# Patient Record
Sex: FEMALE | Race: BLACK OR AFRICAN AMERICAN | Employment: PART TIME | URBAN - METROPOLITAN AREA
[De-identification: names, ages, dates, MRNs, and addresses within clinical notes are randomized per-mention and may not be internally consistent; named-entity substitution may affect disease eponyms.]

---
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
## 2023-12-02 NOTE — PLAN OF CARE
Patient a/o x4, on room air. Temps lightly elevated 99.3 in PM. Continued on IVF, IV ceftriaxone, and nystatin. Potassium being replaced. Pt reports still having loose BMs, increased frequency of urine. No nausea/vomiting noted. Up to bathroom by self. Family visiting at bedside. Enteric precautions maintained, fall precautions maintained, call light in reach, bed locked in low position. Problem: Patient Centered Care  Goal: Patient preferences are identified and integrated in the patient's plan of care  Description: Interventions:  - What would you like us to know as we care for you?  I am a sophomore at [employer].  - Provide timely, complete, and accurate information to patient/family  - Incorporate patient and family knowledge, values, beliefs, and cultural backgrounds into the planning and delivery of care  - Encourage patient/family to participate in care and decision-making at the level they choose  - Honor patient and family perspectives and choices  Outcome: Progressing     Problem: Patient/Family Goals  Goal: Patient/Family Long Term Goal  Description: Patient's Long Term Goal: Feel better    Interventions:  - IV abx  - Replace electrolytes  - IVF  - UA  - GI stool panel  - Strict intake and ouput  - See additional Care Plan goals for specific interventions  Outcome: Progressing  Goal: Patient/Family Short Term Goal  Description: Patient's Short Term Goal: No pain    Interventions:   - Pain meds prn  - US kidney  - CT pelvis  - Monitor vitals  - Reposition  - See additional Care Plan goals for specific interventions  Outcome: Progressing     Problem: CARDIOVASCULAR - ADULT  Goal: Maintains optimal cardiac output and hemodynamic stability  Description: INTERVENTIONS:  - Monitor vital signs, rhythm, and trends  - Monitor for bleeding, hypotension and signs of decreased cardiac output  - Evaluate effectiveness of vasoactive medications to optimize hemodynamic stability  - Monitor arterial and/or venous puncture sites for bleeding and/or hematoma  - Assess quality of pulses, skin color and temperature  - Assess for signs of decreased coronary artery perfusion - ex.  Angina  - Evaluate fluid balance, assess for edema, trend weights  Outcome: Progressing  Goal: Absence of cardiac arrhythmias or at baseline  Description: INTERVENTIONS:  - Continuous cardiac monitoring, monitor vital signs, obtain 12 lead EKG if indicated  - Evaluate effectiveness of antiarrhythmic and heart rate control medications as ordered  - Initiate emergency measures for life threatening arrhythmias  - Monitor electrolytes and administer replacement therapy as ordered  Outcome: Progressing     Problem: PAIN - ADULT  Goal: Verbalizes/displays adequate comfort level or patient's stated pain goal  Description: INTERVENTIONS:  - Encourage pt to monitor pain and request assistance  - Assess pain using appropriate pain scale  - Administer analgesics based on type and severity of pain and evaluate response  - Implement non-pharmacological measures as appropriate and evaluate response  - Consider cultural and social influences on pain and pain management  - Manage/alleviate anxiety  - Utilize distraction and/or relaxation techniques  - Monitor for opioid side effects  - Notify MD/LIP if interventions unsuccessful or patient reports new pain  - Anticipate increased pain with activity and pre-medicate as appropriate  Outcome: Progressing     Problem: RISK FOR INFECTION - ADULT  Goal: Absence of fever/infection during anticipated neutropenic period  Description: INTERVENTIONS  - Monitor WBC  - Administer growth factors as ordered  - Implement neutropenic guidelines  Outcome: Progressing     Problem: GASTROINTESTINAL - ADULT  Goal: Minimal or absence of nausea and vomiting  Description: INTERVENTIONS:  - Maintain adequate hydration with IV or PO as ordered and tolerated  - Nasogastric tube to low intermittent suction as ordered  - Evaluate effectiveness of ordered antiemetic medications  - Provide nonpharmacologic comfort measures as appropriate  - Advance diet as tolerated, if ordered  - Obtain nutritional consult as needed  - Evaluate fluid balance  Outcome: Progressing  Goal: Maintains or returns to baseline bowel function  Description: INTERVENTIONS:  - Assess bowel function  - Maintain adequate hydration with IV or PO as ordered and tolerated  - Evaluate effectiveness of GI medications  - Encourage mobilization and activity  - Obtain nutritional consult as needed  - Establish a toileting routine/schedule  - Consider collaborating with pharmacy to review patient's medication profile  Outcome: Progressing declines

## (undated) NOTE — LETTER
11/29/2018              Amie Sanchez        [id number]         To Whom it may concern:     This is to certify that Jaki Diana had an appointment on 11/29/2018 at 12:05 PM with Gia Meyers MD.        Sincerely,    S

## (undated) NOTE — LETTER
VACCINE ADMINISTRATION RECORD  PARENT / GUARDIAN APPROVAL  Date: 2019  Vaccine administered to: Latisha Nation     : 3/11/2005    MRN: [id number]    A copy of the appropriate Centers for Disease Control and Prevention Vaccine Information statement ha

## (undated) NOTE — MR AVS SNAPSHOT
[address]               Thank you for choosing us for your health care visit with HENNA Trammell.   We are glad to serve you and happy to provide you with this summary of your visi virus. Your child may have coughing, phlegm, body aches, and a low fever. A common type of bronchitis is known as a chest cold (acute bronchitis). This often happens after a respiratory infection like a common cold.  Bronchitis can take weeks to go away, bu blood. UTIs are very common. Antibiotics usually help treat these infections. · Some ear infections. In some cases, your child’s healthcare provider may prescribe antibiotics for an ear infection.  Your child may need a test to show what’s causing the ear · Use a humidifier or cool mist vaporizer in your child’s room. · Have your child gargle with saltwater.   · Have your child drink warm liquids.   To ease ear pain:   · Hold a warm, moist washcloth on your child’s ear for 10 minutes at a time.      When to Commonly known as:  AUGMENTIN                Where to Get Your Medications      These medications were sent to [address] [place] [address] [address] , [phone], [phone]  [address] Rickey Settler To help children live healthy active lives, parents can:  o Be role models themselves by making healthy eating and daily physical activity the norm for their family.   o Create a home where healthy choices are available and encouraged  o Make it fun – find

## (undated) NOTE — LETTER
Name:  Elizabeth Young Year:  11th Grade Class: Student ID No.:   Address:  [address] [address] Phone:  [phone] (home)  :  10year old   Name Relationship [address] Work Phone Home Phone Mobile Phone   1.  JOSHI polymorphic ventricular tachycardia? 13. Does anyone in your family have a heart problem, pacemaker, or implanted defibrillator? 12. Has anyone in your family had unexplained fainting, seizures, or near drowning?      BONE AND JOINT QUESTIONS Yes No 38. Have you ever had numbness, tingling, or weakness in your arms or legs after being hit or falling? 39.Have you ever been unable to move your arms / legs after being hit /fall? 40. Have you ever become ill while exercising in the heat?     41.  D excavatum,      arachnodactyly, arm span > height, hyperlaxity, myopia, MVP, aortic insufficiency) Yes    Eyes/Ears/Nose/Throat:  Pupils equal    Hearing Yes    Lymph nodes Yes    Heart*  · Murmurs (auscultation standing, supine, +/- Valsalva)  · Location Protocol.  We have reviewed the policy and understand that I/our student may be asked to submit to testing for the presence of performance-enhancing substances in my/his/her body either during [employer] state series events or during the school day, and I/our jt

## (undated) NOTE — LETTER
Name:  Ricardo Her Year:    Class: Student ID No.:   Address:  [address] [address] Phone:  [phone] (home)  :  14year old   Name Relationship [address] Work Phone Home Phone Mobile Phone   1.  Misty Taylor 15. Does anyone in your family have hypertrophic cardiomyopathy, Marfan syndrome, arrhythmogenic right ventricular cardiomyopathy, long QT syndrome, short QT syndrome, Brugada syndrome, or catecholaminergic polymorphic ventricular tachycardia? No   15.  Silva 29. Have you ever had a head injury or concussion? No   35. Have you ever had a hit or blow to the head that caused confusion, prolonged headache, or memory problems? No   36. Do you have a history of seizure disorder? No   37.  Do you have headaches with e /76   Pulse 87   Temp 97.4 °F (36.3 °C) (Temporal)   Resp 18   Ht 5' 8.3\" (1.735 m)   Wt (!) 300 lb 12.8 oz   BMI 45.34 kg/m²  >99 %ile (Z= 2.47) based on CDC (Girls, 2-20 Years) BMI-for-age based on BMI available as of 8/24/2020. female    Vision: *effective January 2003, the [employer] of Directors approved a recommendation, consistent with the [facility] [employer], that allows [employer] or Advanced Nurse Practitioners to sign off on physicals.    [facility] Substance Testing Policy Consent t ©2010 AAFP, AAP, [address] [facility], [place] [employer] for [facility], [address] [facility] for [address] of Sports Medicine.  Permission granted to reprint for noncommercial, educat

## (undated) NOTE — LETTER
[facility] [email or web address] of Child Health Examination       Student's Name  Michael Couch Birth Date Title                           Date     Signature HEALTH HISTORY          TO BE COMPLETED AND SIGNED BY PARENT/GUARDIAN AND VERIFIED BY HEALTH CARE PROVIDER    ALLERGIES  (Food, drug, insect, other)  Dog Fennel; Dust; Mold; [address]  (List all prescribed or taken on a regular basis.)  No current /76   Temp 98.4 °F (36.9 °C) (Oral)   Ht 5' 8.25\" (1.734 m)   Wt 287 lb 6.4 oz (130.4 kg)   LMP 02/16/2019 (Approximate)   BMI 43.38 kg/m²     DIABETES SCREENING  BMI>85% age/sex  No And any two of the following:  Family History No    Ethnic Minorit Respiratory Yes                   Diagnosis of Asthma: No Mental Health Yes        Currently Prescribed Asthma Medication:            Quick-relief  medication (e.g. Short Acting Beta Antagonist): No          Controller medication (e.g. inhaled corticostero

## (undated) NOTE — LETTER
3/2/2020          To Whom It May Concern:    Eros Mclaughlin is currently under my medical care. Please excuse the patient from school missed as she has been ill. May return to school when well.     If you require additional information please contact our o

## (undated) NOTE — LETTER
Name:  Zaina Browning Year:  9th Grade Class: Student ID No.:   Address:  [address] [address] Phone:  [phone] (home)  :  13year old   Name Relationship [address] Work Phone Home Phone Mobile Phone   1.  MADELYN polymorphic ventricular tachycardia? 13. Does anyone in your family have a heart problem, pacemaker, or implanted defibrillator? 12. Has anyone in your family had unexplained fainting, seizures, or near drowning?      BONE AND JOINT QUESTIONS Yes No 38. Have you ever had numbness, tingling, or weakness in your arms or legs after being hit or falling? 39.Have you ever been unable to move your arms / legs after being hit /fall? 40. Have you ever become ill while exercising in the heat?     41.  D excavatum,      arachnodactyly, arm span > height, hyperlaxity, myopia, MVP, aortic insufficiency) Yes    Eyes/Ears/Nose/Throat:  Pupils equal    Hearing Yes    Lymph nodes Yes    Heart*  · Murmurs (auscultation standing, supine, +/- Valsalva)  · Location Protocol.  We have reviewed the policy and understand that I/our student may be asked to submit to testing for the presence of performance-enhancing substances in my/his/her body either during [employer] state series events or during the school day, and I/our jt

## (undated) NOTE — LETTER
[facility] [employer] of Child Health Examination       Student's Name  Roe Nichole Birth Date Signature                                                                                                                                   Title                           Date     Signature Female School   Grade Level/ID#  12th Grade   HEALTH HISTORY          TO BE COMPLETED AND SIGNED BY PARENT/GUARDIAN AND VERIFIED BY HEALTH CARE PROVIDER    ALLERGIES  (Food, drug, insect, other)  Dog Fennel; Dust; Mold; [address]  (List all prescri PHYSICAL EXAMINATION REQUIREMENTS (head circumference if <33 years old):   /76   Pulse 87   Temp 97.4 °F (36.3 °C) (Temporal)   Resp 18   Ht 5' 8.3\" (1.735 m)   Wt (!) 300 lb 12.8 oz (136.4 kg)   BMI 45.34 kg/m²     DIABETES SCREENING  BMI>85% age/ Mouth/Dental Yes  Spinal examination Yes    Cardiovascular/HTN Yes  Nutritional status Yes    Respiratory Yes                   Diagnosis of Asthma: No Mental Health Yes        Currently Prescribed Asthma Medication:            Quick-relief  medication (e.

## (undated) NOTE — LETTER
Sarah Wharton, [address]  [address], [place]       08/19/19        Patient: Heike Combs   YOB: 2005   Date of Visit: 8/19/2019       Dear  Dr. Suraj Galvez MD,      Thank you for referring Heike Combs to my practice.   Pl

## (undated) NOTE — LETTER
[facility] [employer] of Child Health Examination       Student's Name  Cherry Home Birth Date Title                           Date     Signature HEALTH HISTORY          TO BE COMPLETED AND SIGNED BY PARENT/GUARDIAN AND VERIFIED BY HEALTH CARE PROVIDER    ALLERGIES  (Food, drug, insect, other)  Dog Fennel; Dust; Mold; [address]  (List all prescribed or taken on a regular basis.)  No current /81   Pulse 73   Temp 97.7 °F (36.5 °C) (Tympanic)   Resp 16   Ht 5' 7.5\" (1.715 m)   Wt 264 lb (119.7 kg)   BMI 40.74 kg/m²     DIABETES SCREENING  BMI>85% age/sex  No And any two of the following:  Family History No    Ethnic Minority  Yes Respiratory Yes                   Diagnosis of Asthma: No Mental Health Yes        Currently Prescribed Asthma Medication:            Quick-relief  medication (e.g. Short Acting Beta Antagonist): No          Controller medication (e.g. inhaled corticostero

## (undated) NOTE — MR AVS SNAPSHOT
[facility]  [address], [address]  [phone]               Thank you for choosing us for your health care visit with Luanne Garg MD.  We are glad to serve you and happy to provide you with this summary o To schedules an appointment with an outpatient dietitian, please call Central Scheduling at [phone]. Select option 1 for an [facility] location, option 2 for an [facility] location        Please call [phone] to schedule an appointment. [email or web address]

## (undated) NOTE — LETTER
11/29/2018              Amie Sanchez        [address] [address]         To Whom it may concern:     This is to certify that Lucy Lozoya had an appointment on 11/29/2018 at 11:15 PM with Arnold Caal MD.        Sincerely,    S

## (undated) NOTE — LETTER
Name:  Zaina Browning Year:  12th Grade Class: Student ID No.:   Address:  [address] [address] Phone:  [phone] (home)  :  14year old   Name Relationship [address] Work Phone Home Phone Mobile Phone   1.  Braxton Zamora 15. Does anyone in your family have hypertrophic cardiomyopathy, Marfan syndrome, arrhythmogenic right ventricular cardiomyopathy, long QT syndrome, short QT syndrome, Brugada syndrome, or catecholaminergic polymorphic ventricular tachycardia? No   15.  [address] 56 [facility]. Have you ever had a head injury or concussion? No   35. Have you ever had a hit or blow to the head that caused confusion, prolonged headache, or memory problems? No   36. Do you have a history of seizure disorder? No   37.  Do you have headaches with e /76   Pulse 87   Temp 97.4 °F (36.3 °C) (Temporal)   Resp 18   Ht 5' 8.3\" (1.735 m)   Wt (!) 300 lb 12.8 oz (136.4 kg)   BMI 45.34 kg/m²  >99 %ile (Z= 2.47) based on CDC (Girls, 2-20 Years) BMI-for-age based on BMI available as of 8/24/2020. female *effective January 2003, the [employer] of Directors approved a recommendation, consistent with the [facility] [employer], that allows [employer] or Advanced Nurse Practitioners to sign off on physicals.    [facility] Substance Testing Policy Consent t ©2010 AAFP, AAP, [address] [facility], [place] [employer] for [address] [facility], 45 [facility] for [address] of Sports Medicine.  Permission granted to reprint for noncommercial, educat

## (undated) NOTE — LETTER
7/29/2022          To Whom It May Concern:    Zach Lisa is currently under my medical care and was seen in clinic today for an acute medical condition. My patient has been instructed to stay on bed rest until 8/7/2022. She is cleared to return to work without restrictions on 8/8/2022. If you require additional information please contact our office.         Sincerely,      Ella Grajeda MD          Document generated by:  Ella Grajeda MD

## (undated) NOTE — LETTER
Name:  Sundar Stringer Year:  10th Grade Class: Student ID No.:   Address:  [address] [address] Phone:  [phone] (home)  :  11year old   Name Relationship [address] Work Phone Home Phone Mobile Phone   1.  JOSHI polymorphic ventricular tachycardia? 13. Does anyone in your family have a heart problem, pacemaker, or implanted defibrillator? 12. Has anyone in your family had unexplained fainting, seizures, or near drowning?      BONE AND JOINT QUESTIONS Yes No 38. Have you ever had numbness, tingling, or weakness in your arms or legs after being hit or falling? 39.Have you ever been unable to move your arms / legs after being hit /fall? 40. Have you ever become ill while exercising in the heat?     41.  D MVP, aortic insufficiency) Yes    Eyes/Ears/Nose/Throat:  Pupils equal    Hearing Yes    Lymph nodes Yes    Heart*  · Murmurs (auscultation standing, supine, +/- Valsalva)  · Location of point of maximal impulse (PMI) Yes    Pulses Yes    Lungs Yes    Abdo may be asked to submit to testing for the presence of performance-enhancing substances in my/his/her body either during [employer] state series events or during the school day, and I/our student do/does hereby agree to submit to such testing and analysis by a ce